# Patient Record
Sex: FEMALE | Race: BLACK OR AFRICAN AMERICAN | NOT HISPANIC OR LATINO | Employment: UNEMPLOYED | URBAN - METROPOLITAN AREA
[De-identification: names, ages, dates, MRNs, and addresses within clinical notes are randomized per-mention and may not be internally consistent; named-entity substitution may affect disease eponyms.]

---

## 2024-07-10 ENCOUNTER — APPOINTMENT (OUTPATIENT)
Dept: INTERPRETER SERVICES | Facility: CLINIC | Age: 24
End: 2024-07-10

## 2024-07-10 ENCOUNTER — HOSPITAL ENCOUNTER (EMERGENCY)
Facility: CLINIC | Age: 24
Discharge: HOME | End: 2024-07-12
Attending: EMERGENCY MEDICINE | Admitting: EMERGENCY MEDICINE

## 2024-07-10 DIAGNOSIS — R44.3 HALLUCINATIONS: ICD-10-CM

## 2024-07-10 DIAGNOSIS — F23 ACUTE PSYCHOSIS (H): ICD-10-CM

## 2024-07-10 PROBLEM — F20.3 UNDIFFERENTIATED SCHIZOPHRENIA (H): Status: ACTIVE | Noted: 2024-07-10

## 2024-07-10 LAB
ALBUMIN UR-MCNC: NEGATIVE MG/DL
AMPHETAMINES UR QL SCN: NORMAL
ANION GAP SERPL CALCULATED.3IONS-SCNC: 7 MMOL/L (ref 7–15)
APPEARANCE UR: CLEAR
BARBITURATES UR QL SCN: NORMAL
BASOPHILS # BLD AUTO: 0 10E3/UL (ref 0–0.2)
BASOPHILS NFR BLD AUTO: 0 %
BENZODIAZ UR QL SCN: NORMAL
BILIRUB UR QL STRIP: NEGATIVE
BUN SERPL-MCNC: 7.5 MG/DL (ref 6–20)
BZE UR QL SCN: NORMAL
CALCIUM SERPL-MCNC: 9.1 MG/DL (ref 8.6–10)
CANNABINOIDS UR QL SCN: NORMAL
CHLORIDE SERPL-SCNC: 102 MMOL/L (ref 98–107)
COLOR UR AUTO: ABNORMAL
CREAT SERPL-MCNC: 0.66 MG/DL (ref 0.51–0.95)
DEPRECATED HCO3 PLAS-SCNC: 28 MMOL/L (ref 22–29)
EGFRCR SERPLBLD CKD-EPI 2021: >90 ML/MIN/1.73M2
EOSINOPHIL # BLD AUTO: 0.1 10E3/UL (ref 0–0.7)
EOSINOPHIL NFR BLD AUTO: 2 %
ERYTHROCYTE [DISTWIDTH] IN BLOOD BY AUTOMATED COUNT: 13.8 % (ref 10–15)
FENTANYL UR QL: NORMAL
FLUAV RNA SPEC QL NAA+PROBE: NEGATIVE
FLUBV RNA RESP QL NAA+PROBE: NEGATIVE
GLUCOSE SERPL-MCNC: 92 MG/DL (ref 70–99)
GLUCOSE UR STRIP-MCNC: NEGATIVE MG/DL
HCG UR QL: NEGATIVE
HCT VFR BLD AUTO: 39.1 % (ref 35–47)
HGB BLD-MCNC: 12.3 G/DL (ref 11.7–15.7)
HGB UR QL STRIP: ABNORMAL
HOLD SPECIMEN: NORMAL
HOLD SPECIMEN: NORMAL
IMM GRANULOCYTES # BLD: 0 10E3/UL
IMM GRANULOCYTES NFR BLD: 0 %
KETONES UR STRIP-MCNC: NEGATIVE MG/DL
LEUKOCYTE ESTERASE UR QL STRIP: NEGATIVE
LYMPHOCYTES # BLD AUTO: 2.7 10E3/UL (ref 0.8–5.3)
LYMPHOCYTES NFR BLD AUTO: 37 %
MAGNESIUM SERPL-MCNC: 1.9 MG/DL (ref 1.7–2.3)
MCH RBC QN AUTO: 28.6 PG (ref 26.5–33)
MCHC RBC AUTO-ENTMCNC: 31.5 G/DL (ref 31.5–36.5)
MCV RBC AUTO: 91 FL (ref 78–100)
MONOCYTES # BLD AUTO: 0.7 10E3/UL (ref 0–1.3)
MONOCYTES NFR BLD AUTO: 10 %
MUCOUS THREADS #/AREA URNS LPF: PRESENT /LPF
NEUTROPHILS # BLD AUTO: 3.7 10E3/UL (ref 1.6–8.3)
NEUTROPHILS NFR BLD AUTO: 52 %
NITRATE UR QL: NEGATIVE
NRBC # BLD AUTO: 0 10E3/UL
NRBC BLD AUTO-RTO: 0 /100
OPIATES UR QL SCN: NORMAL
PCP QUAL URINE (ROCHE): NORMAL
PH UR STRIP: 5.5 [PH] (ref 5–7)
PLATELET # BLD AUTO: 275 10E3/UL (ref 150–450)
POTASSIUM SERPL-SCNC: 4 MMOL/L (ref 3.4–5.3)
RBC # BLD AUTO: 4.3 10E6/UL (ref 3.8–5.2)
RBC URINE: 3 /HPF
RSV RNA SPEC NAA+PROBE: NEGATIVE
SARS-COV-2 RNA RESP QL NAA+PROBE: NEGATIVE
SODIUM SERPL-SCNC: 137 MMOL/L (ref 135–145)
SP GR UR STRIP: 1.01 (ref 1–1.03)
SQUAMOUS EPITHELIAL: 2 /HPF
UROBILINOGEN UR STRIP-MCNC: <2 MG/DL
WBC # BLD AUTO: 7.2 10E3/UL (ref 4–11)
WBC URINE: 1 /HPF

## 2024-07-10 PROCEDURE — 85025 COMPLETE CBC W/AUTO DIFF WBC: CPT

## 2024-07-10 PROCEDURE — 80307 DRUG TEST PRSMV CHEM ANLYZR: CPT

## 2024-07-10 PROCEDURE — 36415 COLL VENOUS BLD VENIPUNCTURE: CPT

## 2024-07-10 PROCEDURE — 83735 ASSAY OF MAGNESIUM: CPT

## 2024-07-10 PROCEDURE — 99284 EMERGENCY DEPT VISIT MOD MDM: CPT

## 2024-07-10 PROCEDURE — 87637 SARSCOV2&INF A&B&RSV AMP PRB: CPT | Performed by: EMERGENCY MEDICINE

## 2024-07-10 PROCEDURE — 81001 URINALYSIS AUTO W/SCOPE: CPT

## 2024-07-10 PROCEDURE — 81025 URINE PREGNANCY TEST: CPT

## 2024-07-10 PROCEDURE — 82374 ASSAY BLOOD CARBON DIOXIDE: CPT

## 2024-07-10 RX ORDER — OLANZAPINE 10 MG/2ML
10 INJECTION, POWDER, FOR SOLUTION INTRAMUSCULAR 2 TIMES DAILY PRN
Status: DISCONTINUED | OUTPATIENT
Start: 2024-07-10 | End: 2024-07-12 | Stop reason: HOSPADM

## 2024-07-10 RX ORDER — LORAZEPAM 1 MG/1
1 TABLET ORAL EVERY 8 HOURS PRN
Status: DISCONTINUED | OUTPATIENT
Start: 2024-07-10 | End: 2024-07-12 | Stop reason: HOSPADM

## 2024-07-10 RX ORDER — IBUPROFEN 600 MG/1
600 TABLET, FILM COATED ORAL EVERY 6 HOURS PRN
Status: DISCONTINUED | OUTPATIENT
Start: 2024-07-10 | End: 2024-07-12 | Stop reason: HOSPADM

## 2024-07-10 ASSESSMENT — ENCOUNTER SYMPTOMS
DIZZINESS: 0
NAUSEA: 0
RHINORRHEA: 0
VOMITING: 0
HEMATURIA: 0
HEADACHES: 1
DIARRHEA: 0
SLEEP DISTURBANCE: 1
ABDOMINAL PAIN: 0
CONFUSION: 0
SHORTNESS OF BREATH: 0
LIGHT-HEADEDNESS: 0
FEVER: 0
WEAKNESS: 0
NUMBNESS: 0
DYSURIA: 0
HALLUCINATIONS: 0
SORE THROAT: 0
BLOOD IN STOOL: 0
CONSTIPATION: 0
NERVOUS/ANXIOUS: 0
CHILLS: 0

## 2024-07-10 ASSESSMENT — ACTIVITIES OF DAILY LIVING (ADL)
ADLS_ACUITY_SCORE: 35
ADLS_ACUITY_SCORE: 33
ADLS_ACUITY_SCORE: 35
ADLS_ACUITY_SCORE: 33

## 2024-07-10 ASSESSMENT — COLUMBIA-SUICIDE SEVERITY RATING SCALE - C-SSRS
1. IN THE PAST MONTH, HAVE YOU WISHED YOU WERE DEAD OR WISHED YOU COULD GO TO SLEEP AND NOT WAKE UP?: NO
2. HAVE YOU ACTUALLY HAD ANY THOUGHTS OF KILLING YOURSELF?: NO
2. HAVE YOU ACTUALLY HAD ANY THOUGHTS OF KILLING YOURSELF IN THE PAST MONTH?: NO
6. HAVE YOU EVER DONE ANYTHING, STARTED TO DO ANYTHING, OR PREPARED TO DO ANYTHING TO END YOUR LIFE?: NO
ATTEMPT LIFETIME: NO
1. HAVE YOU WISHED YOU WERE DEAD OR WISHED YOU COULD GO TO SLEEP AND NOT WAKE UP?: NO
6. HAVE YOU EVER DONE ANYTHING, STARTED TO DO ANYTHING, OR PREPARED TO DO ANYTHING TO END YOUR LIFE?: NO
TOTAL  NUMBER OF ABORTED OR SELF INTERRUPTED ATTEMPTS LIFETIME: NO
TOTAL  NUMBER OF INTERRUPTED ATTEMPTS LIFETIME: NO

## 2024-07-10 NOTE — ED PROVIDER NOTES
EMERGENCY DEPARTMENT ENCOUNTER      NAME: Delma Guzmán  AGE: 23 year old female  YOB: 2000  MRN: [id number]  EVALUATION DATE & TIME: No admission date for patient encounter.    PCP: No Ref-Primary, Physician    ED PROVIDER: Elisabeth Garnett PA-C      Chief Complaint   Patient presents with    Hallucinations    Headache         FINAL IMPRESSION:  1. Acute psychosis (H)          ED COURSE & MEDICAL DECISION MAKIN:51 PM Met with patient for initial interview. Plan for care discussed.  3:24 PM Staffed patient with Dr. William.  4:25 PM RN alerted patient was about to have DEC assessment, but then DEC power went out.  5:17 PM Spoke with DEC  who agrees with plan for admission and psychiatry consultation.  5:27 PM Reevaluated and updated patient. Plan for admission discussed with patient and patient's mother who are agreeable. All questions and concerns addressed. Patient is holdable.    23 year old otherwise healthy female presents to the Emergency Department for evaluation of hallucinations and insomnia since . Patient was brought in by her mother. Patient also reports a 1/10 frontal headache. Upon exam, patient is afebrile and hemodynamically stable. Patient is intermittently smiling, laugh, and whispering throughout exam, but denies auditory or visual hallucinations when asked. Patient answers questions appropriately and neurologic exam otherwise without focal deficit. Differential diagnosis includes but not limited to schizophrenia, bipolar manic episode, drug-induced psychosis, psychosis secondary to insomnia, pregnancy, UTI, electrolyte abnormality, anemia, dehydration. Low suspicion for SAH as headache not sudden onset, 10/10 severity, and no family history of cerebral aneurysms. Low suspicion for intracranial tumor as no neurological deficits on exam, symptoms not worse at night, no cancer history, fevers/chills, night sweats, or unintentional weight loss. Low suspicion  for meningitis as no associated fevers/chills, nuchal rigidity, or other meningeal signs on exam. Low suspicion for temporal arteritis as no associated tenderness to palpation over temporal arteries or associated vision changes.     CBC without leukocytosis or anemia. BMP WNL. Mg WNL. Urine preg negative. UDS negative. UA without evidence of infection.     Symptoms and workup most consistent with acute psychosis. DEC  evaluated. Plan for admission with psychiatry consultation in the morning. Patient is holdable. Patient and patient's mother were made aware of the above findings.     Medical Decision Making  Obtained supplemental history:Supplemental history obtained?: Documented in chart and Caregiver  Reviewed external records: External records reviewed?: No  Care impacted by chronic illness:N/A  Care significantly affected by social determinants of health:N/A  Did you consider but not order tests?: Work up considered but not performed and documented in chart, if applicable  Did you interpret images independently?: Independent interpretation of ECG and images noted in documentation, when applicable.  Consultation discussion with other provider:Did you involve another provider (consultant, , pharmacy, etc.)?: I discussed the care with another health care provider, see documentation for details.  Admit.    MEDICATIONS GIVEN IN THE EMERGENCY:  Medications   ibuprofen (ADVIL/MOTRIN) tablet 600 mg (has no administration in time range)   LORazepam (ATIVAN) tablet 1 mg (has no administration in time range)   OLANZapine (zyPREXA) injection 10 mg (has no administration in time range)       NEW PRESCRIPTIONS STARTED AT TODAY'S ER VISIT  New Prescriptions    No medications on file          =================================================================    HPI    Patient information was obtained from: patient and patient's mother    Use of : Yes (Phone) - Language Mani Guzmán is a  23 year old otherwise female who presents to this ED for evaluation of hallucinations and insomnia since Sunday. Patient was brought in by her mother. Patient also reports a 1/10 frontal headache. Patient is accompanied by her mother who reports that she has started noticing hallucinations on Sunday.  She notes that the patient is intermittently smiling, laughing, and whispering to herself.  Patient's mother notes that she has no previous history of hallucinations or other mental health disorders in the past.  They deny any family history of similar symptoms in other family members.  Patient denies any new medications or any chronic medications, changes in dosing.  She reports she takes no medications at baseline.  She denies any alcohol or other drug use.  She reports a 1 out of 10 frontal headache as well that has been going on for the last few days.  She denies any exertional component to the headache, blood thinners, fevers, chills, night sweats, unintentional weight loss, cancer history, vision changes, numbness/weakness/tingling in her arms or legs.  She denies any hallucinations, visual or auditory.  She denies any suicidal or homicidal ideation or plan.  She states that she has not slept in 2 days.  She denies previous history of insomnia.  No other impulsive behaviors per patient's mother.  Patient otherwise denies any fevers, chills, chest pain, shortness of breath, abdominal pain, concerns for pregnancy, urinary symptoms, or any other complaints. She reports her LMP was today.    REVIEW OF SYSTEMS   Review of Systems   Constitutional:  Negative for chills and fever.   HENT:  Negative for congestion, hearing loss, rhinorrhea, sore throat and tinnitus.    Eyes:  Negative for visual disturbance.   Respiratory:  Negative for shortness of breath.    Cardiovascular:  Negative for chest pain.   Gastrointestinal:  Negative for abdominal pain, blood in stool, constipation, diarrhea, nausea and vomiting.    Genitourinary:  Positive for vaginal bleeding (current menses). Negative for dysuria, hematuria, pelvic pain, urgency and vaginal discharge.   Allergic/Immunologic: Negative for immunocompromised state.   Neurological:  Positive for headaches. Negative for dizziness, syncope, weakness, light-headedness and numbness.   Psychiatric/Behavioral:  Positive for sleep disturbance. Negative for confusion, hallucinations, self-injury and suicidal ideas. The patient is not nervous/anxious.      PAST MEDICAL HISTORY:  No past medical history on file.    PAST SURGICAL HISTORY:  No past surgical history on file.        CURRENT MEDICATIONS:    No current outpatient medications on file.      ALLERGIES:  No Known Allergies    FAMILY HISTORY:  No family history on file.    SOCIAL HISTORY:   Social History     Socioeconomic History    Marital status: Single       VITALS:  /69   Pulse 78   Temp 98.1  F (36.7  C)   Resp 19   Wt 72.6 kg (160 lb)   SpO2 98%     PHYSICAL EXAM    Constitutional:  Alert, in no acute distress. Cooperative.  EYES: Conjunctivae clear. PERRL. EOM intact. Peripheral fields intact.  HENT:  Atraumatic, normocephalic. Oropharynx clear. Moist membranes. Tongue without deviation. No temporal tenderness to palpation. No trismus. No nuchal rigidity. Full ROM neck without pain.  Respiratory:  Respirations even, unlabored, in no acute respiratory distress. Lungs clear to auscultation bilaterally without wheeze, rhonchi, or rales. No cough. Speaks in full sentences easily.  Cardiovascular:  Regular rate and rhythm, good peripheral perfusion. No peripheral edema. No chest wall tenderness.  GI: Soft, flat, non-distended.  Musculoskeletal:  No edema. No cyanosis. Range of motion major extremities intact.    Integument: Warm, Dry. No rash to visualized skin.   Neurologic:  Alert & oriented x4. No focal deficits noted. GCS 15. Sensation intact. Motor intact. Coordination intact. 5/5 strength.   Psych: Reacting to  external stimuli, whispering to self and smiling/laughing intermittently. Denies suicidal or homicidal ideation or plan.     LAB:  All pertinent labs reviewed and interpreted.  Results for orders placed or performed during the hospital encounter of 07/10/24   Basic metabolic panel   Result Value Ref Range    Sodium 137 135 - 145 mmol/L    Potassium 4.0 3.4 - 5.3 mmol/L    Chloride 102 98 - 107 mmol/L    Carbon Dioxide (CO2) 28 22 - 29 mmol/L    Anion Gap 7 7 - 15 mmol/L    Urea Nitrogen 7.5 6.0 - 20.0 mg/dL    Creatinine 0.66 0.51 - 0.95 mg/dL    GFR Estimate >90 >60 mL/min/1.73m2    Calcium 9.1 8.6 - 10.0 mg/dL    Glucose 92 70 - 99 mg/dL   Result Value Ref Range    Magnesium 1.9 1.7 - 2.3 mg/dL   UA with Microscopic reflex to Culture    Specimen: Urine, Clean Catch   Result Value Ref Range    Color Urine Light Yellow Colorless, Straw, Light Yellow, Yellow    Appearance Urine Clear Clear    Glucose Urine Negative Negative mg/dL    Bilirubin Urine Negative Negative    Ketones Urine Negative Negative mg/dL    Specific Gravity Urine 1.011 1.001 - 1.030    Blood Urine 0.1 mg/dL (A) Negative    pH Urine 5.5 5.0 - 7.0    Protein Albumin Urine Negative Negative mg/dL    Urobilinogen Urine <2.0 <2.0 mg/dL    Nitrite Urine Negative Negative    Leukocyte Esterase Urine Negative Negative    Mucus Urine Present (A) None Seen /LPF    RBC Urine 3 (H) <=2 /HPF    WBC Urine 1 <=5 /HPF    Squamous Epithelials Urine 2 (H) <=1 /HPF   HCG qualitative urine (UPT)   Result Value Ref Range    hCG Urine Qualitative Negative Negative   CBC with platelets and differential   Result Value Ref Range    WBC Count 7.2 4.0 - 11.0 10e3/uL    RBC Count 4.30 3.80 - 5.20 10e6/uL    Hemoglobin 12.3 11.7 - 15.7 g/dL    Hematocrit 39.1 35.0 - 47.0 %    MCV 91 78 - 100 fL    MCH 28.6 26.5 - 33.0 pg    MCHC 31.5 31.5 - 36.5 g/dL    RDW 13.8 10.0 - 15.0 %    Platelet Count 275 150 - 450 10e3/uL    % Neutrophils 52 %    % Lymphocytes 37 %    % Monocytes 10  %    % Eosinophils 2 %    % Basophils 0 %    % Immature Granulocytes 0 %    NRBCs per 100 WBC 0 <1 /100    Absolute Neutrophils 3.7 1.6 - 8.3 10e3/uL    Absolute Lymphocytes 2.7 0.8 - 5.3 10e3/uL    Absolute Monocytes 0.7 0.0 - 1.3 10e3/uL    Absolute Eosinophils 0.1 0.0 - 0.7 10e3/uL    Absolute Basophils 0.0 0.0 - 0.2 10e3/uL    Absolute Immature Granulocytes 0.0 <=0.4 10e3/uL    Absolute NRBCs 0.0 10e3/uL   Extra Red Top Tube   Result Value Ref Range    Hold Specimen JIC    Extra Purple Top Tube   Result Value Ref Range    Hold Specimen JIC    Urine Drug Screen Panel   Result Value Ref Range    Amphetamines Urine Screen Negative Screen Negative    Barbituates Urine Screen Negative Screen Negative    Benzodiazepine Urine Screen Negative Screen Negative    Cannabinoids Urine Screen Negative Screen Negative    Cocaine Urine Screen Negative Screen Negative    Fentanyl Qual Urine Screen Negative Screen Negative    Opiates Urine Screen Negative Screen Negative    PCP Urine Screen Negative Screen Negative       RADIOLOGY:  Reviewed all pertinent imaging. Please see official radiology report.  No orders to display     Elisabeth Garnett PA-C  [facility] EMERGENCY ROOM  [address] [address] [phone]      Elisabeth Garnett PA-C  07/10/24 6209

## 2024-07-10 NOTE — ED NOTES
I am seeing this patient along with Elisabeth Garnett PA-C. I had a face to face encounter with this patient seen by the Advanced Practice Provider (SARAH).  I have seen, examined, and discussed the patient with the SARAH and agree with their assessment and plan of management. I personally saw the patient and performed a substantive portion of the visit including all aspects of the medical decision making.    HPI: 23-year-old female here with family for evaluation of hallucinations.  Denies drug use, substance use, family history of mental health, suicidal or homicidal ideation.  No previous history of same    Physical Exam:  awake, alert, clearly responding to external stimuli, laughing at things that are not there, psychotic appearing.  No SI or HI      LABS  Pertinent lab results reviewed in chart.  Labs Ordered and Resulted from Time of ED Arrival to Time of ED Departure   ROUTINE UA WITH MICROSCOPIC REFLEX TO CULTURE - Abnormal       Result Value    Color Urine Light Yellow      Appearance Urine Clear      Glucose Urine Negative      Bilirubin Urine Negative      Ketones Urine Negative      Specific Gravity Urine 1.011      Blood Urine 0.1 mg/dL (*)     pH Urine 5.5      Protein Albumin Urine Negative      Urobilinogen Urine <2.0      Nitrite Urine Negative      Leukocyte Esterase Urine Negative      Mucus Urine Present (*)     RBC Urine 3 (*)     WBC Urine 1      Squamous Epithelials Urine 2 (*)    BASIC METABOLIC PANEL - Normal    Sodium 137      Potassium 4.0      Chloride 102      Carbon Dioxide (CO2) 28      Anion Gap 7      Urea Nitrogen 7.5      Creatinine 0.66      GFR Estimate >90      Calcium 9.1      Glucose 92     MAGNESIUM - Normal    Magnesium 1.9     HCG QUALITATIVE URINE - Normal    hCG Urine Qualitative Negative     URINE DRUG SCREEN PANEL - Normal    Amphetamines Urine Screen Negative      Barbituates Urine Screen Negative      Benzodiazepine Urine Screen Negative      Cannabinoids Urine Screen  Negative      Cocaine Urine Screen Negative      Fentanyl Qual Urine Screen Negative      Opiates Urine Screen Negative      PCP Urine Screen Negative     CBC WITH PLATELETS AND DIFFERENTIAL    WBC Count 7.2      RBC Count 4.30      Hemoglobin 12.3      Hematocrit 39.1      MCV 91      MCH 28.6      MCHC 31.5      RDW 13.8      Platelet Count 275      % Neutrophils 52      % Lymphocytes 37      % Monocytes 10      % Eosinophils 2      % Basophils 0      % Immature Granulocytes 0      NRBCs per 100 WBC 0      Absolute Neutrophils 3.7      Absolute Lymphocytes 2.7      Absolute Monocytes 0.7      Absolute Eosinophils 0.1      Absolute Basophils 0.0      Absolute Immature Granulocytes 0.0      Absolute NRBCs 0.0         EKG      RADIOLOGY  No orders to display       PROCEDURES       ED COURSE & MEDICAL DECISION MAKING    Pertinent Labs and Imagaing reviewed (see chart for details)    23 year old female here for evaluation of hallucinations.  Clinically, she is absolutely hallucinating and appears psychotic.  No history of this in the chart, but mom noted that there was an episode of something similar back in 2019 and she was better when she was on medication, but they do not know what that medication was and that physician has since retired.  Clearly age wise she is at risk for this being a true schizophrenia versus a substance-induced mood disorder.  We have ordered a drug screen but patient denies any.  After DEC assessment, plan will be to pursue admission due to acute psychosis.  We do not feel comfortable discharging the patient in her current state.  Patient will remain here in the ED awaiting bed placement.    At the conclusion of the encounter I discussed  the results of all of the tests and the disposition.   The questions were answered.  The patient or family acknowledged understanding and was agreeable with the care plan.       FINAL IMPRESSION      1. Acute psychosis (H)            CRITICAL CARE  0  Minutes    Sigrid William MD  7/10/2024 3:24 PM       Sigrid William MD  07/10/24 0800     162.56

## 2024-07-10 NOTE — ED PROVIDER NOTES
[facility] EMERGENCY ROOM   ED Mental Health Observation - Initiation Note    Delma Guzmán was placed into observation at 5:24 PM on 7/10/2024 for Psychosis.   Patient is expected to be under observation status for a minimum of eight hours.    MD Nataliia Parr Shari Ann, MD  07/10/24 9804

## 2024-07-10 NOTE — ED TRIAGE NOTES
Pt arrives with mom with reports of a headache for the past couple days, insomnia and hallucinations. Pt seems distracted in triage but is able to answer questions via [employer] .      Triage Assessment (Adult)       Row Name 07/10/24 1416          Triage Assessment    Airway WDL WDL        Respiratory WDL    Respiratory WDL WDL        Skin Circulation/Temperature WDL    Skin Circulation/Temperature WDL WDL        Cardiac WDL    Cardiac WDL WDL        Peripheral/Neurovascular WDL    Peripheral Neurovascular WDL WDL

## 2024-07-10 NOTE — Clinical Note
Delma Guzmán was seen and treated in our emergency department on 7/10/2024.  She may return to work on 07/29/2024.       If you have any questions or concerns, please don't hesitate to call.      Bandar Linares MD

## 2024-07-10 NOTE — ED NOTES
Patient's mom came out of the room with the patient wanting to leave the hospital with the patient. Through [employer] , patient's mom asking that patient be allowed to go home briefly with her to get some stuff they need then bring her back. RN told patient that this is not an option as DEC recommended overnight hospital stay for observation. MD was notified of patient's request and agrees that patient is hold able and cannot leave tonight. At this point, patient agrees to stay as long as the mom stays with her.  Woodrow Diehl RN  7/10/2024  6:32 PM

## 2024-07-11 ENCOUNTER — TELEPHONE (OUTPATIENT)
Dept: BEHAVIORAL HEALTH | Facility: CLINIC | Age: 24
End: 2024-07-11

## 2024-07-11 LAB
ATRIAL RATE - MUSE: 71 BPM
DIASTOLIC BLOOD PRESSURE - MUSE: NORMAL MMHG
INTERPRETATION ECG - MUSE: NORMAL
P AXIS - MUSE: 47 DEGREES
PR INTERVAL - MUSE: 146 MS
QRS DURATION - MUSE: 70 MS
QT - MUSE: 390 MS
QTC - MUSE: 423 MS
R AXIS - MUSE: 25 DEGREES
SYSTOLIC BLOOD PRESSURE - MUSE: NORMAL MMHG
T AXIS - MUSE: 28 DEGREES
VENTRICULAR RATE- MUSE: 71 BPM

## 2024-07-11 PROCEDURE — 250N000013 HC RX MED GY IP 250 OP 250 PS 637: Performed by: EMERGENCY MEDICINE

## 2024-07-11 PROCEDURE — 93005 ELECTROCARDIOGRAM TRACING: CPT | Performed by: REGISTERED NURSE

## 2024-07-11 PROCEDURE — 99207 PR NO BILLABLE SERVICE THIS VISIT: CPT | Performed by: REGISTERED NURSE

## 2024-07-11 RX ORDER — QUETIAPINE FUMARATE 25 MG/1
25 TABLET, FILM COATED ORAL 2 TIMES DAILY
Status: DISCONTINUED | OUTPATIENT
Start: 2024-07-11 | End: 2024-07-12 | Stop reason: HOSPADM

## 2024-07-11 RX ADMIN — QUETIAPINE FUMARATE 25 MG: 25 TABLET ORAL at 18:41

## 2024-07-11 NOTE — ED NOTES
Pt wandered into waiting room and hallway, reoriented patient and got pt a 1:1 sitter in room. Pt cooperative with returning to room.

## 2024-07-11 NOTE — CONSULTS
Psychiatry Consultation; Follow up       Psychiatry consulted regarding initiation of medications.     Delma Guzmán presents to the ED with family/friends. Patient is presenting to the ED for the following concerns: Significant behavioral change.   Factors that make the mental health crisis life threatening or complex are:  Pt came to ED with her mother because she has been responding to internal stimuli and not sleeping. Pt was assessed with a [place]  and her mother provided most of the information as pt was frequently laughing to herself or staring off into space. Mother reported things were fine, no stressors until a week ago when symptoms began. Pt is not sleeping and c/o of a headache. Pt and mother denied SI, SIB and HI.       Patient was unavailable when this writer attempted to meet with her. Patient background discussed with [facility]. EKG ordered prior to starting medications. If QTc below 500, can initiated Seroquel 25 mg BID. Discussed recommendations with inpatient medical provider.     Given acuity of patient's presentation, would recommend continued referral to inpatient psychiatry - can call [facility] at [phone].     Will keep psychiatry consult order open for follow up and will see patient 7/12/24.         Page me or re-consult psychiatry as needed.        Ida Josue, MARGO, APRN  Consult/Liaison Psychiatry  [facility]   Contact information available via [facility] Paging/Directory.  If I am not available, please call [facility] intake ([phone])

## 2024-07-11 NOTE — PROGRESS NOTES
"Triage & Transition Services, [facility]     Therapy Progress Note    Patient: Naciimo goes by \"Naciimo,\" uses she/her pronouns  Date of Service: July 11, 2024  Site of Service: [facility] EMERGENCY ROOM                             [id number]  Patient was seen yes  Mode of Assessment: Virtual: AmWell    Presentation Summary: Writer met with patient virtually via [employer].  Met with patient via [employer] .  Patient's mother is present at bedside.  Patient was awake and alert, she was sitting up on the gurney.  Patient said \"I am feeling well today\".  When asked about sleep overnight, patient said \"yes I slept good\", though later patient's mother reported patient continued to struggle with sleep overnight and she didn't get much rest.  Patient denied any significant changes or stressors, she said everything has been fine at home and at work, patient said the only change is \"I've been talking to myself\".  Patient continues to appear to be responding to internal stimuli, she has been whispering to herself, laughing/smiling inappropriately.  At times, patient was fidgeting with her hands and placed her hands on her face, and when her mother started talking she abruptly laid down and stopped engaging with writer.  Writer reviewed plan of care and recommendation for IP MH admission.  Patient said she understood, though said she is ambivalent because she wants to go home.  Patient's mother expressed concern over continued insomnia and hallucinations, she encouraged patient to continue to consider IP MH admission.  Patient ultimately agreed, she continues to consent to treatment and is voluntary for admission.  Patient's mother expressed she has been feeling frustrated that the patient will not allow her to leave the hospital, patient replied that she wants her mother there and she feels scared when/if she leaves.  Patient and her mother agree to talk about this further.  Patient " consented to psychiatry consult, patient's mother added it will be important for the patient to start medications.  Patient continues to present with impaired insight and judgement related to symptoms.    Therapeutic Intervention(s) Provided: Coached on coping techniques/relaxation skills to help improve distress tolerance and managing intense emotions., Reviewed healthy living that supports positive mental health, including looking at sleep hygiene, regular movement, nutrition, and regular socialization.    Current Symptoms: anxious avoidance, apathy, impaired decision making anxious distractability      Mental Status Exam   Affect: Labile  Appearance: Appropriate  Attention Span/Concentration: Inattentive, Attentive  Eye Contact: Variable    Fund of Knowledge: Delayed   Language /Speech Content: Fluent  Language /Speech Volume: Normal  Language /Speech Rate/Productions: Minimally Responsive  Recent Memory: Variable  Remote Memory: Poor  Mood: Apathetic  Orientation to Person: Yes   Orientation to Place: Yes  Orientation to Time of Day: Yes  Orientation to Date: Yes     Situation (Do they understand why they are here?): Yes  Psychomotor Behavior: Normal  Thought Content: Hallucinations  Thought Form: Tangential    Treatment Objective(s) Addressed: rapport building, orienting the patient to therapy, processing feelings, identifying an appropriate aftercare plan, assessing safety, exploring obstacles to safety in the community, identifying additional supports    Patient Response to Interventions: acceptance expressed, needs reinforcement    Progress Towards Goals: Patient Reports Symptoms Are: ongoing  Patient Progress Toward Goals: is making progress  Next Step to Work Toward Discharge: symptom stabilization, patient ability to engage in safety planning, engaging in safety planning with collateral sources      Plan: inpatient mental health  yes provider ADRIAN Sequeira  yes    Clinical Substantiation:  Continue to recommend IP MH admission for further safety and stabilization. Patient presents with decreased mental capacity, responding to internal stimuli, and insomnia.  Patient is not able to identify any significant stressors or changes, she continues to present with impaired insight and judgement. Patient continues to consent for treatment and is voluntary for IP MH admission.    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

## 2024-07-11 NOTE — ED NOTES
IP MH Referral Acuity Rating Score (RARS)     LMHP complete at referral to IP MH, with DEC; and, daily while awaiting IP MH placement. Call score to PPS.  CRITERIA SCORING   New 71 HH and Involuntary for IP MH (not adolescent) 0/1   Boarding over 24 hours 1/1   Vulnerable adult at least 55+ with multiple co morbidities; or, Patient age 11 or under 0/1   Suicide ideation without relief of precipitating factors 0/1   Current plan for suicide 0/1   Current plan for homicide 0/1   Imminent risk or actual attempt to seriously harm another without relief of factors precipitating the attempt 0/1   Severe dysfunction in daily living (ex: complete neglect for self care, extreme disruption in vegetative function, extreme deterioration in social interactions) 1/1   Recent (last 2 weeks) or current physical aggression in the ED 0/1   Restraints or seclusion episode in ED 0/1   Verbal aggression, agitation, yelling, etc., while in the ED 0/1   Active psychosis with psychomotor agitation or catatonia 1/1   Need for constant or near constant redirection (from leaving, from others, etc).  0/1   Intrusive or disruptive behaviors 1/1   TOTAL Acuity Total Score: 4

## 2024-07-11 NOTE — TELEPHONE ENCOUNTER
R:   [facility] availability within [facility]  [facility] @ 12[facility]03[facility]    [facility] is posting 0 beds. [phone]; per call at 7:06 am to Lakesha, they are at cap.      [facility] is posting 0 beds. Negative covid required     [facility] is posting 0 beds. Neg covid. No high school/[facility]. [phone]. Per call at 7:08 am to Juventino, he will ask charge nurse to call us back re: bed availability.      [facility] is posting 0 beds. [phone]     [facility] is posting 0 beds. [phone]      [facility] is posting 0 beds. Ages 18-35. Negative covid. [phone]. Per call at 7:10 am, left a  asking for a call back re: bed avail.      [facility] is posting 0 beds.      [facility] is posting 0 beds [phone]     Pt to remain on the worklist pending appropriate bed availability

## 2024-07-11 NOTE — TELEPHONE ENCOUNTER
R: [place]  Access Inpatient Bed Call Log  7/10/2024 11:35 PM  Intake has called facilities that have not updated their bed status within the last 12 hours.??      ADULTS:     *METRO  [place] -- [facility]: @ cap per website.  [place] -- [facility]:  @ Cap per website.   [place] -- [facility]: @ Cap per website.  [place] -- [facility]: @ Cap per website. - Per Mac Aguilar @ 11:35PM, they are full  [place] -- [facility]: @ Cap per website.  [facility] -- [facility]: @ Cap per website.  [place] -- [facility]/YA beds @ Cap per website. Ages 18-28, Voluntary only, COVID test req'd, NO aggression, physical or sexual assault, violence hx or drug abuse, or psychosis. -Per Shu @ 11:42PM, 1 YA bed- Reviewing at capacity  [place] -- [facility]: @ Cap per website.  [place] -- [facility]: @ cap per website.  [place] -- [facility]:  @ Posting 1 bed. - Per Joanie @ 11:36PM, they are at full capacity tonight     Pt remains on waitlist pending appropriate placement availability.

## 2024-07-11 NOTE — ED NOTES
Per provider no need to make room safe at this time. Pt denies any thoughts of hurting self or others. Mother is at bedside. Call light within reach.

## 2024-07-11 NOTE — CONSULTS
Diagnostic Evaluation Consultation  Crisis Assessment    Patient Name: Delma Guzmán  Age:  23 year old  Legal Sex: female  Gender Identity: female  Pronouns:   Race: Black or   Ethnicity: Not  or   Language: Egyptian      Patient was assessed: Virtual: [employer]   Crisis Assessment Start Date: 07/10/24  Crisis Assessment Start Time: 1645  Crisis Assessment Stop Time: 1720  Patient location: [facility] EMERGENCY ROOM                             [id number]    Referral Data and Chief Complaint  Delma Guzmán presents to the ED with family/friends. Patient is presenting to the ED for the following concerns: Significant behavioral change.   Factors that make the mental health crisis life threatening or complex are:  Pt came to ED with her mother because she has been responding to internal stimuli and not sleeping. Pt was assessed with a Egyptian  and her mother provided most of the information as pt was frequently laughing to herself or staring off into space. Mother reported things were fine, no stressors until a week ago when symptoms began. Pt is not sleeping and c/o of a headache. Pt and mother denied SI, SIB and HI..      Informed Consent and Assessment Methods  Explained the crisis assessment process, including applicable information disclosures and limits to confidentiality, assessed understanding of the process, and obtained consent to proceed with the assessment.  Assessment methods included conducting a formal interview with patient, review of medical records, collaboration with medical staff, and obtaining relevant collateral information from family and community providers when available.  : done     Patient response to interventions: acceptance expressed  Coping skills were attempted to reduce the crisis:  none     History of the Crisis   Mother reported pt had a similar experience in 2019 and mother said she went to the hospital but  "was no admitted, said she was given medicaiton and she was better/back to normal in a month. Mother reported she went to a hospital in [place] but said the doctor who saw pt retired and mother did not remember what medication pt was given, mother also did not remember what diagnosis was given to pt at this time. Mother was very concerned about pt returning to work, she works for [employer] and mother repeatedly asked for a letter to be written to pt's work excusing her absence, mother does not want pt to lose this job. Pt lives at home with her mother and two brothers and it is suspected that pt is the primary financial provider for the family. Pt reported her only support is her mother and she does not have any friends, pt could not identify any coping skills but said she enjoys movies, resturants and eating food.  Mother and pt requested pt to be discharged with medication, but attending and  felt it was safer for pt to stabilize at the hospital rather than returning to work before stabilizing and ensuring pt was prescribed the correct medications and given the proper diagnosis. Pt denied command hallucinations and visual hallucinations but she was unable to describe what she was hearing and responding to, when asked what she was laughing at she reported \"I just laugh at myself sometimes.\"    Brief Psychosocial History  Family:  Single, Children no  Support System:  Parent(s), Sibling(s)  Employment Status:  employed full-time  Source of Income:  salary/wages  Financial Environmental Concerns:  none  Current Hobbies:  family functions, television/movies/videos, cooking/baking  Barriers in Personal Life:  lack of companionship    Significant Clinical History  Current Anxiety Symptoms:     Current Depression/Trauma:  impaired decision making  Current Somatic Symptoms:  somatic symptoms (abdominal pain, headache, tension)  Current Psychosis/Thought Disturbance:  auditory hallucinations  Current Eating " Symptoms:     Chemical Use History:      Past diagnosis:  Other (mother did not know or could not remember previous diagnosis)  Family history:  No known history of mental health or chemical health concerns  Past treatment:  Psychiatric Medication Management  Details of most recent treatment:  similar experience in 2019, was discharged with meds  Other relevant history:          Collateral Information  Is there collateral information: Yes     Collateral information name, relationship, phone number:  SHOLA DUQUE (Mother)  [phone] (Home Phone)    What happened today: Pt has been talking and laughing to herself for about a week, this happened before in 2019 and she got better after medications so mom brought her in for help.     What is different about patient's functioning: Pt laughs at herself and talks to herself, mom cannot understand what she is saying to herself, pt is unable to work.     Concern about alcohol/drug use:      What do you think the patient needs:      Has patient made comments about wanting to kill themselves/others: no    If d/c is recommended, can they take part in safety/aftercare planning:  yes    Additional collateral information:  Mom would like pt to discharge home with medications, mom would like a note from the hospital provided for pt's work to excuse her.     Risk Assessment  [place] Suicide Severity Rating Scale Full Clinical Version:  Suicidal Ideation  Q6 Suicide Behavior (Lifetime): no     Suicidal Behavior (Lifetime)  Actual Attempt (Lifetime): No  Has subject engaged in non-suicidal self-injurious behavior? (Lifetime): No  Interrupted Attempts (Lifetime): No  Aborted or Self-Interrupted Attempt (Lifetime): No  Preparatory Acts or Behavior (Lifetime): No    [place] Suicide Severity Rating Scale Recent:   Suicidal Ideation (Recent)  Q1 Wished to be Dead (Past Month): no  Q2 Suicidal Thoughts (Past Month): no  Level of Risk per Screen: no risks indicated          Environmental  or Psychosocial Events: challenging interpersonal relationships, social isolation  Protective Factors: Protective Factors: strong bond to family unit, community support, or employment, responsibilities and duties to others, including pets and children, lives in a responsibly safe and stable environment    Does the patient have thoughts of harming others? Feels Like Hurting Others: no  Previous Attempt to Hurt Others: no  Is the patient engaging in sexually inappropriate behavior?: no    Is the patient engaging in sexually inappropriate behavior?  no        Mental Status Exam   Affect: Labile  Appearance: Appropriate  Attention Span/Concentration: Inattentive  Eye Contact: Avoidant    Fund of Knowledge: Delayed   Language /Speech Content: Non-Fluent  Language /Speech Volume: Normal  Language /Speech Rate/Productions: Minimally Responsive  Recent Memory: Variable  Remote Memory: Poor  Mood: Euphoric  Orientation to Person: Yes   Orientation to Place: Yes  Orientation to Time of Day: Yes  Orientation to Date: Yes     Situation (Do they understand why they are here?): Yes  Psychomotor Behavior: Normal  Thought Content: Hallucinations  Thought Form: Flight of Ideas     Mini-Cog Assessment  Number of Words Recalled:    Clock-Drawing Test:     Three Item Recall:    Mini-Cog Total Score:       Medication  Psychotropic medications:   Medication Orders - Psychiatric (From admission, onward)      Start     Dose/Rate Route Frequency Ordered Stop    07/10/24 1724  OLANZapine (zyPREXA) injection 10 mg         10 mg Intramuscular 2 TIMES DAILY PRN 07/10/24 1724      07/10/24 1724  LORazepam (ATIVAN) tablet 1 mg         1 mg Oral EVERY 8 HOURS PRN 07/10/24 1724               Current Care Team  Patient Care Team:  No Ref-Primary, Physician as PCP - General    Diagnosis  Patient Active Problem List   Diagnosis Code    Hallucinations R44.3    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 Pt's mother reports pt has experienced this before but does not remember what the diagnosis was or what medication was used to treat the psychosis. Pt's mother is requesting she return to work as soon as possible, pt would benefir from in patient admission for medication stabilization and observation and should not discharge until symptoms of current presentation are gone.          Severe psychiatric, behavioral or other comorbid conditions are appropriate for management at inpatient mental health as indicated by at least one of the following: Psychiatric Symptoms  Severe dysfunction in daily living is present as indicated by at least one of the following: Extreme deterioration in social interactions, Complete withdrawal from all social interactions  Situation and expectations are appropriate for inpatient care: Voluntary treatment at lower level of care is not feasible, Need for physical restraint, seclusion, or other involuntary treatment intervention is present, Patient management/treatment at lower level of care is not feasible or is inappropriate  Inpatient mental health services are necessary to meet patient needs and at least one of the following: Specific condition related to admission diagnosis is present and judged likely to further improve at proposed level of care, Specific condition related to admission diagnosis is present and judged likely to deteriorate in absence of treatment at proposed level of care      Patient coping skills attempted to reduce the crisis:  none    Disposition  Recommended disposition: Inpatient Mental Health        Reviewed case and recommendations with attending provider. Attending Name: Elisabeth Garnett PA-C       Attending  concurs with disposition: yes       Patient and/or validated legal guardian concurs with disposition:   no       Final disposition:  inpatient mental health    Legal status on admission: 72 Hour Hold    Assessment Details   Total duration spent with the patient: 35 min     CPT code(s) utilized: 63697 - Psychotherapy for Crisis - 60 (30-74*) min    ZELALEM Corea, Psychotherapist  [facility] - Triage & Transition Services  Callback: [phone]

## 2024-07-11 NOTE — PROGRESS NOTES
Naciimo HAYLEE Guzmán  July 10, 2024  Plan of Care Hand-off Note     Patient Care Path: (P) inpatient mental health    Plan for Care:   Pt presents with decreased mental capacity, auditory hallucinations that she is not able to explain or provide detail. Pt's mother reports pt has experienced this before but does not remember what the diagnosis was or what medication was used to treat the psychosis. Pt's mother is requesting she return to work as soon as possible, pt would benefir from in patient admission for medication stabilization and observation and should not discharge until symptoms of current presentation are gone.    Identified Goals and Safety Issues: (P) Stabilize pt with medication, alleviate psychosis/hallucination symptoms.    Overview:  (P) SHOLA DUQUE (Mother)  [phone] (Home Phone)       (P) Dial numbers for the patient - do not hand them a phone and walk away    Legal Status: Legal Status at Admission: 72 Hour Hold  72 Hour Hold - Date/Time Initiated: 7/10/24 5:30 pm  72 Hour Hold - Date/Time Ends: 7/13/24 5:30 pm    Psychiatry Consult: yes       Updated   regarding plan of care.           ZELALEM Corea

## 2024-07-11 NOTE — ED NOTES
[facility] ED Mental Health Handoff Note:     Assuming care from: Dr Linus Rdz    Brief HPI: 23 year old female signed out to me by the above provider. See initial ED Provider note for full details of the presentation.   In brief, patient presented for acute/new onset psychosis     Home meds reviewed and ordered/administered: Yes (none)  Medically stable for inpatient mental health admission: Yes.  Evaluated by mental health: Yes. The recommendation is for inpatient mental health treatment. Bed search in process  Safety concerns: At the time I received sign out, there were no safety concerns.    Hold Status:  Active Orders   N/A            ED Meds:  Medications   ibuprofen (ADVIL/MOTRIN) tablet 600 mg (has no administration in time range)   LORazepam (ATIVAN) tablet 1 mg (has no administration in time range)   OLANZapine (zyPREXA) injection 10 mg (has no administration in time range)     No orders to display       ED Course:       There were no significant events during my shift.    Patient was signed out to the oncoming provider, Dr. Boo Wiley at shift change    Impression:    ICD-10-CM    1. Acute psychosis (H)  F23           Plan:    Awaiting inpatient mental health admission/transfer.    Ciro Barron MD  [facility] EMERGENCY ROOM  [facility] [facility] [id number] [phone]     Ciro Barron MD  07/11/24 0709

## 2024-07-11 NOTE — ED PROVIDER NOTES
[facility] EMERGENCY ROOM   ED Mental Health Observation - Daily Note for 7/11/2024    Delma Guzmán is a 23 year old female currently boarding in the ED while awaiting placement for Psychosis.  Please see the initial H&P for this patient's presentation, workup, and disposition plan.     Hold Status:  Patient is Voluntary, but holdable    Plan:  In brief, the patient's presentation is notable for psychosis.   Patient is awaiting Mental health placement    Interim History:  There were no significant events since last note.    Physical Exam:  /69   Pulse 78   Temp 98.1  F (36.7  C)   Resp 19   Wt 72.6 kg (160 lb)   SpO2 98%   No respiratory distress, on room air   Well perfused  Behavior appropriate    Medications provided prior to my care:  Medications   ibuprofen (ADVIL/MOTRIN) tablet 600 mg (has no administration in time range)   LORazepam (ATIVAN) tablet 1 mg (has no administration in time range)   OLANZapine (zyPREXA) injection 10 mg (has no administration in time range)       Laboratory (reviewed and interpreted):  Labs Ordered and Resulted from Time of ED Arrival to Time of ED Departure   ROUTINE UA WITH MICROSCOPIC REFLEX TO CULTURE - Abnormal       Result Value    Color Urine Light Yellow      Appearance Urine Clear      Glucose Urine Negative      Bilirubin Urine Negative      Ketones Urine Negative      Specific Gravity Urine 1.011      Blood Urine 0.1 mg/dL (*)     pH Urine 5.5      Protein Albumin Urine Negative      Urobilinogen Urine <2.0      Nitrite Urine Negative      Leukocyte Esterase Urine Negative      Mucus Urine Present (*)     RBC Urine 3 (*)     WBC Urine 1      Squamous Epithelials Urine 2 (*)    BASIC METABOLIC PANEL - Normal    Sodium 137      Potassium 4.0      Chloride 102      Carbon Dioxide (CO2) 28      Anion Gap 7      Urea Nitrogen 7.5      Creatinine 0.66      GFR Estimate >90      Calcium 9.1      Glucose 92     MAGNESIUM - Normal     Magnesium 1.9     HCG QUALITATIVE URINE - Normal    hCG Urine Qualitative Negative     URINE DRUG SCREEN PANEL - Normal    Amphetamines Urine Screen Negative      Barbituates Urine Screen Negative      Benzodiazepine Urine Screen Negative      Cannabinoids Urine Screen Negative      Cocaine Urine Screen Negative      Fentanyl Qual Urine Screen Negative      Opiates Urine Screen Negative      PCP Urine Screen Negative     INFLUENZA A/B, RSV, & SARS-COV2 PCR - Normal    Influenza A PCR Negative      Influenza B PCR Negative      RSV PCR Negative      SARS CoV2 PCR Negative     CBC WITH PLATELETS AND DIFFERENTIAL    WBC Count 7.2      RBC Count 4.30      Hemoglobin 12.3      Hematocrit 39.1      MCV 91      MCH 28.6      MCHC 31.5      RDW 13.8      Platelet Count 275      % Neutrophils 52      % Lymphocytes 37      % Monocytes 10      % Eosinophils 2      % Basophils 0      % Immature Granulocytes 0      NRBCs per 100 WBC 0      Absolute Neutrophils 3.7      Absolute Lymphocytes 2.7      Absolute Monocytes 0.7      Absolute Eosinophils 0.1      Absolute Basophils 0.0      Absolute Immature Granulocytes 0.0      Absolute NRBCs 0.0         ED Course:  7:00 AM I met with the patient and discussed plan with care team.     Impression/Plan:  1. Acute psychosis (H)        MD Beatrice Hernandez Michael, MD  07/11/24 3425

## 2024-07-12 ENCOUNTER — HOSPITAL ENCOUNTER (INPATIENT)
Age: 24
End: 2024-07-12
Attending: PSYCHIATRY & NEUROLOGY | Admitting: PSYCHIATRY & NEUROLOGY

## 2024-07-12 ENCOUNTER — TELEPHONE (OUTPATIENT)
Dept: BEHAVIORAL HEALTH | Facility: CLINIC | Age: 24
End: 2024-07-12

## 2024-07-12 ENCOUNTER — APPOINTMENT (OUTPATIENT)
Dept: INTERPRETER SERVICES | Facility: CLINIC | Age: 24
End: 2024-07-12

## 2024-07-12 VITALS
DIASTOLIC BLOOD PRESSURE: 63 MMHG | HEART RATE: 79 BPM | OXYGEN SATURATION: 98 % | WEIGHT: 160 LBS | TEMPERATURE: 98.1 F | SYSTOLIC BLOOD PRESSURE: 107 MMHG | RESPIRATION RATE: 19 BRPM

## 2024-07-12 PROCEDURE — 99244 OFF/OP CNSLTJ NEW/EST MOD 40: CPT | Performed by: REGISTERED NURSE

## 2024-07-12 PROCEDURE — 250N000013 HC RX MED GY IP 250 OP 250 PS 637: Performed by: EMERGENCY MEDICINE

## 2024-07-12 RX ORDER — LANOLIN ALCOHOL/MO/W.PET/CERES
3 CREAM (GRAM) TOPICAL
OUTPATIENT
Start: 2024-07-12

## 2024-07-12 RX ORDER — OLANZAPINE 10 MG/1
10 TABLET ORAL 3 TIMES DAILY PRN
OUTPATIENT
Start: 2024-07-12

## 2024-07-12 RX ORDER — IBUPROFEN 600 MG/1
600 TABLET, FILM COATED ORAL EVERY 6 HOURS PRN
OUTPATIENT
Start: 2024-07-12

## 2024-07-12 RX ORDER — QUETIAPINE FUMARATE 25 MG/1
25 TABLET, FILM COATED ORAL 2 TIMES DAILY
Qty: 60 TABLET | Refills: 0 | Status: SHIPPED | OUTPATIENT
Start: 2024-07-12 | End: 2024-08-11

## 2024-07-12 RX ORDER — QUETIAPINE FUMARATE 25 MG/1
25 TABLET, FILM COATED ORAL 2 TIMES DAILY
Status: DISCONTINUED | OUTPATIENT
Start: 2024-07-12 | End: 2024-07-12

## 2024-07-12 RX ORDER — ACETAMINOPHEN 325 MG/1
650 TABLET ORAL EVERY 4 HOURS PRN
OUTPATIENT
Start: 2024-07-12

## 2024-07-12 RX ORDER — AMOXICILLIN 250 MG
1 CAPSULE ORAL 2 TIMES DAILY PRN
OUTPATIENT
Start: 2024-07-12

## 2024-07-12 RX ORDER — QUETIAPINE FUMARATE 25 MG/1
25 TABLET, FILM COATED ORAL 2 TIMES DAILY
OUTPATIENT
Start: 2024-07-12

## 2024-07-12 RX ORDER — MAGNESIUM HYDROXIDE/ALUMINUM HYDROXICE/SIMETHICONE 120; 1200; 1200 MG/30ML; MG/30ML; MG/30ML
30 SUSPENSION ORAL EVERY 4 HOURS PRN
OUTPATIENT
Start: 2024-07-12

## 2024-07-12 RX ORDER — OLANZAPINE 10 MG/2ML
10 INJECTION, POWDER, FOR SOLUTION INTRAMUSCULAR 3 TIMES DAILY PRN
OUTPATIENT
Start: 2024-07-12

## 2024-07-12 RX ORDER — HYDROXYZINE HYDROCHLORIDE 25 MG/1
25 TABLET, FILM COATED ORAL EVERY 4 HOURS PRN
OUTPATIENT
Start: 2024-07-12

## 2024-07-12 RX ADMIN — QUETIAPINE FUMARATE 25 MG: 25 TABLET ORAL at 08:06

## 2024-07-12 ASSESSMENT — ACTIVITIES OF DAILY LIVING (ADL)
ADLS_ACUITY_SCORE: 35

## 2024-07-12 ASSESSMENT — COLUMBIA-SUICIDE SEVERITY RATING SCALE - C-SSRS
6. HAVE YOU EVER DONE ANYTHING, STARTED TO DO ANYTHING, OR PREPARED TO DO ANYTHING TO END YOUR LIFE?: NO
1. SINCE LAST CONTACT, HAVE YOU WISHED YOU WERE DEAD OR WISHED YOU COULD GO TO SLEEP AND NOT WAKE UP?: NO
TOTAL  NUMBER OF ABORTED OR SELF INTERRUPTED ATTEMPTS SINCE LAST CONTACT: NO
TOTAL  NUMBER OF INTERRUPTED ATTEMPTS SINCE LAST CONTACT: NO
ATTEMPT SINCE LAST CONTACT: NO
2. HAVE YOU ACTUALLY HAD ANY THOUGHTS OF KILLING YOURSELF?: NO
SUICIDE, SINCE LAST CONTACT: NO

## 2024-07-12 NOTE — ED PROVIDER NOTES
NSR, rate 71, normal intervals, no acute ichemia    I have independently reviewed and interpreted the EKG(s) documented above     Pt started on seroquel 25mg BID per psych recommendations.     Joel Wiley MD  07/11/24 1919

## 2024-07-12 NOTE — TELEPHONE ENCOUNTER
R: [place]  Access Inpatient Bed Call Log  7/12/2024 12:11 AM  Intake has called facilities that have not updated their bed status within the last 12 hours.??      ADULTS:     *METRO  [place] -- [facility]: @ cap per website.  [place] -- [facility]:  @ Cap per website.    [place] -- [facility]: @ Cap per website.  [place] -- [facility]: @ Cap per website. - 12:20 AM Per Ethan, they are able to review.   [place] -- [facility]: @ Cap per website.  [facility] -- [facility]: @ Cap per website.   [place] -- [facility] beds @ Posting 1 beds. Ages 18-28, Voluntary only, COVID test req'd, NO aggression, physical or sexual assault, violence hx or drug abuse, or psychosis - 12:13 AM Per Sergio, they have beds available for review.  [place] -- [facility]: @ Cap per website.  [place] -- [facility]: @ cap per website.  [place] -- [facility]:  @ Cap per website.      Pt remains on waitlist pending appropriate placement availability.

## 2024-07-12 NOTE — ED NOTES
[place] in room now    Peng Advancement Flap Text: The defect edges were debeveled with a #15 scalpel blade.  Given the location of the defect, shape of the defect and the proximity to free margins a Peng advancement flap was deemed most appropriate.  Using a sterile surgical marker, an appropriate advancement flap was drawn incorporating the defect and placing the expected incisions within the relaxed skin tension lines where possible. The area thus outlined was incised deep to adipose tissue with a #15 scalpel blade.  The skin margins were undermined to an appropriate distance in all directions utilizing iris scissors.

## 2024-07-12 NOTE — DISCHARGE INSTRUCTIONS
If you having trouble getting medications, or she is is not taking them, or she seems to be getting worse, please bring her back to emergency department.    [facility]  Director: Jen Callahan   [address]  [address]   [place], [place]   [phone]   [email or web address]  Offers outpatient mental health services in Thai. Offers sliding scale fee option. Mon. - Fri.   8:00 am. - 7:00 pm. Sat. 9:00 am - 3:00 pm    [address] [address]  [phone]  [email or web address]  [facility] has specialized programs for Thai and Southeast  refugees that includes   psychiatry, day treatment, case management, and women's advocacy services. Bilingual staff   speak Thai, [place] citizen, Hmong, Carlos, Cambodian, and Montenegrin.          Coordinators from [facility]) will be calling you in the next 24-48 hours to ensure that you have the resources you need. For additonal need of mental health services, you can also contact [facility] coordinators directly at [phone].    Recommendations:    1)  Take all medications as prescribed by psychiatrist or primary doctor  2)  Consider working with a mental health therapist  3)  Return to the hospital if symptoms persist or worsen    If I am feeling unsafe or I am in a crisis, I will:  - Contact my established care providers   - Call the National Suicide Prevention Lifeline: 101.626.7715   - [place] CRISIS TEXT Line 870147  - Go to the nearest emergency room   - Call 911 or 398 or 211    Below is a list of FREE Mental Health Options in the [facility] Area:  [facility] ([facility])  [address] [place] [address], 24/7 Crisis Intervention Center  Serves those in emotional crisis with 24-hour, seven-day-a-week crisis counseling, assessment, referral, and medication management.      Suicidal: [phone] Consultation: [phone]    "  [facility]  [phone]  Serves those in need of free outpatient mental health care  Hours: Mon, Wed, Fri 1-3pm; Mon-Thurs 6:30-8:30pm    [facility]  [address] [place] [address]  [phone]    Peer Support  Call the REGINALD Helpline at  [phone]  Or text \"HelpLine\" to 69386    [employer]  Fast Tracker  Linking people to mental health and substance use disorder resources  fasttraVyconn.org     Por espanol, texto  ETHAN a 411812 o texto a 442-AYUDAME en [facility] Mental [facility] Warm Line  Peer to peer support  Monday thru Saturday, 12 pm to 10 pm  [phone] or [phone]  Text \"Support\" to 59740    National Bates on Mental Illness (REGINALD)  078.147.3063 or 1.888.REGINALD.HELPS              "

## 2024-07-12 NOTE — CONSULTS
"      Initial Psychiatric Consult   Consult date: July 12, 2024         Reason for Consult, requesting source:    Start medications    Requesting source: Bandar Linares    Labs and imaging reviewed. Provider contacted with recommendations.     Interview was conducted with interpretation through Iranian .         HPI:   Psychiatry seeing patient today regarding start of medications, as well as disposition recommendations.       Delma Guzmán presents to the ED with family/friends. Patient is presenting to the ED for the following concerns: Significant behavioral change. Factors that make the mental health crisis life threatening or complex are: Pt came to ED with her mother because she has been responding to internal stimuli and not sleeping. Pt was assessed with a Iranian  and her mother provided most of the information as pt was frequently laughing to herself or staring off into space. Mother reported things were fine, no stressors until a week ago when symptoms began. Pt is not sleeping and c/o of a headache. Pt and mother denied SI, SIB and HI.     Today, patient's mother reports, \"I would like to take her home with medications. We have a psychiatry appointment set up for her.\" Patient says, \"yes\" when asked if she would be willing to take medications. Family also requesting letter from a provider to explain to patient's employer that she has been in the hospital. Patient denies suicidality or thoughts of harming others. She denies AH/VH, but appears responding to internal stimuli.         Past Psychiatric History:   Mother reported pt had a similar experience in 2019 and mother said she went to the hospital but was no admitted, said she was given medicaiton and she was better/back to normal in a month.         Substance Use and History:     Tobacco Use    Smoking status: Not on file    Smokeless tobacco: Not on file   Substance Use Topics    Alcohol use: Not on file           " Past Medical History:   PAST MEDICAL HISTORY: No past medical history on file.    PAST SURGICAL HISTORY: No past surgical history on file.          Family History:   FAMILY HISTORY: No family history on file.        Social History:   Pt lives at home with her family. She currently works at [employer] and it is suspected that patient is the primary financial support of family.          Physical ROS:   The 10 point Review of Systems is negative other than noted in the HPI or here.           Medications:     Current Facility-Administered Medications   Medication Dose Route Frequency Provider Last Rate Last Admin    QUEtiapine (SEROquel) tablet 25 mg  25 mg Oral BID Joel Wiley MD   25 mg at 07/12/24 0806              Allergies:   No Known Allergies       Labs:     Recent Results (from the past 48 hour(s))   Basic metabolic panel    Collection Time: 07/10/24  3:23 PM   Result Value Ref Range    Sodium 137 135 - 145 mmol/L    Potassium 4.0 3.4 - 5.3 mmol/L    Chloride 102 98 - 107 mmol/L    Carbon Dioxide (CO2) 28 22 - 29 mmol/L    Anion Gap 7 7 - 15 mmol/L    Urea Nitrogen 7.5 6.0 - 20.0 mg/dL    Creatinine 0.66 0.51 - 0.95 mg/dL    GFR Estimate >90 >60 mL/min/1.73m2    Calcium 9.1 8.6 - 10.0 mg/dL    Glucose 92 70 - 99 mg/dL   Magnesium    Collection Time: 07/10/24  3:23 PM   Result Value Ref Range    Magnesium 1.9 1.7 - 2.3 mg/dL   CBC with platelets and differential    Collection Time: 07/10/24  3:23 PM   Result Value Ref Range    WBC Count 7.2 4.0 - 11.0 10e3/uL    RBC Count 4.30 3.80 - 5.20 10e6/uL    Hemoglobin 12.3 11.7 - 15.7 g/dL    Hematocrit 39.1 35.0 - 47.0 %    MCV 91 78 - 100 fL    MCH 28.6 26.5 - 33.0 pg    MCHC 31.5 31.5 - 36.5 g/dL    RDW 13.8 10.0 - 15.0 %    Platelet Count 275 150 - 450 10e3/uL    % Neutrophils 52 %    % Lymphocytes 37 %    % Monocytes 10 %    % Eosinophils 2 %    % Basophils 0 %    % Immature Granulocytes 0 %    NRBCs per 100 WBC 0 <1 /100    Absolute Neutrophils 3.7 1.6 - 8.3  10e3/uL    Absolute Lymphocytes 2.7 0.8 - 5.3 10e3/uL    Absolute Monocytes 0.7 0.0 - 1.3 10e3/uL    Absolute Eosinophils 0.1 0.0 - 0.7 10e3/uL    Absolute Basophils 0.0 0.0 - 0.2 10e3/uL    Absolute Immature Granulocytes 0.0 <=0.4 10e3/uL    Absolute NRBCs 0.0 10e3/uL   Extra Purple Top Tube    Collection Time: 07/10/24  3:23 PM   Result Value Ref Range    Hold Specimen JIC    Extra Red Top Tube    Collection Time: 07/10/24  3:24 PM   Result Value Ref Range    Hold Specimen JIC    UA with Microscopic reflex to Culture    Collection Time: 07/10/24  3:42 PM    Specimen: Urine, Clean Catch   Result Value Ref Range    Color Urine Light Yellow Colorless, Straw, Light Yellow, Yellow    Appearance Urine Clear Clear    Glucose Urine Negative Negative mg/dL    Bilirubin Urine Negative Negative    Ketones Urine Negative Negative mg/dL    Specific Gravity Urine 1.011 1.001 - 1.030    Blood Urine 0.1 mg/dL (A) Negative    pH Urine 5.5 5.0 - 7.0    Protein Albumin Urine Negative Negative mg/dL    Urobilinogen Urine <2.0 <2.0 mg/dL    Nitrite Urine Negative Negative    Leukocyte Esterase Urine Negative Negative    Mucus Urine Present (A) None Seen /LPF    RBC Urine 3 (H) <=2 /HPF    WBC Urine 1 <=5 /HPF    Squamous Epithelials Urine 2 (H) <=1 /HPF   HCG qualitative urine (UPT)    Collection Time: 07/10/24  3:42 PM   Result Value Ref Range    hCG Urine Qualitative Negative Negative   Urine Drug Screen Panel    Collection Time: 07/10/24  3:42 PM   Result Value Ref Range    Amphetamines Urine Screen Negative Screen Negative    Barbituates Urine Screen Negative Screen Negative    Benzodiazepine Urine Screen Negative Screen Negative    Cannabinoids Urine Screen Negative Screen Negative    Cocaine Urine Screen Negative Screen Negative    Fentanyl Qual Urine Screen Negative Screen Negative    Opiates Urine Screen Negative Screen Negative    PCP Urine Screen Negative Screen Negative   Asymptomatic Influenza A/B, RSV, & SARS-CoV2 PCR  (COVID-19) Nasopharyngeal    Collection Time: 07/10/24  9:47 PM    Specimen: Nasopharyngeal; Swab   Result Value Ref Range    Influenza A PCR Negative Negative    Influenza B PCR Negative Negative    RSV PCR Negative Negative    SARS CoV2 PCR Negative Negative   ECG 12-LEAD WITH MUSE (LHE)    Collection Time: 07/11/24  5:26 PM   Result Value Ref Range    Systolic Blood Pressure  mmHg    Diastolic Blood Pressure  mmHg    Ventricular Rate 71 BPM    Atrial Rate 71 BPM    IL Interval 146 ms    QRS Duration 70 ms     ms    QTc 423 ms    P Axis 47 degrees    R AXIS 25 degrees    T Axis 28 degrees    Interpretation ECG       Sinus rhythm  Normal ECG  No previous ECGs available  Confirmed by SEE ED PROVIDER NOTE FOR, ECG INTERPRETATION (4000),  MARGARITA CALDERA ([id number]) on 7/11/2024 5:30:44 PM            Physical and Psychiatric Examination:     /57   Pulse 79   Temp 98.1  F (36.7  C)   Resp 19   Wt 72.6 kg (160 lb)   SpO2 100%   Weight is 160 lbs 0 oz  There is no height or weight on file to calculate BMI.    Physical Exam:  I have reviewed the physical exam as documented by by the medical team and agree with findings and assessment and have no additional findings to add at this time.         MSE:   Appears responding to internal stimuli, but denies AH/VH. Denies SI/SIB. Insight appears poor. Patient minimally engaged in conversation and her mother does most of the speaking for patient.         DSM-5 Diagnosis:   Unspecified psychosis          Assessment:   Psychiatry seeing patient today regarding initiation of medications and disposition recommendations. Patient presented to the ED due to significant behavior change, within the context of responding to internal stimuli and lack of sleep. She had a similar incident in 2019, which improved with medications. Patient does not appear to have a definitive psychiatric diagnosis, and chart review was unrevealing for previous treatments. In addition, patient  and mother did not recall medication that had been used in the past.     Patient was minimally engaged in conversation and her mother did most speaking for her. Patient appeared responding to internal stimuli and distractible. Patient's mother would like to take her home with medications, but I believe she would benefit from inpatient psychiatry for additional supports and medication management.     If patient were to request to leave before transferring to inpatient psychiatry, she is not a danger to herself or others, so she could discharge without placing a 72-hour hold.           Summary of Recommendations:   1) Started Seroquel 25 mg BID to target symptoms consistent with unspecific psychosis    2) Refer to inpatient psychiatry for additional stabilization and supports- can call [facility] at [phone]    Addendum: Discussed recommendations with provider and Extended Care.             Page me or re-consult psychiatry as needed.       Ida Josue, MARGO, APRN  Consult/Liaison Psychiatry  [facility]   Contact information available via [facility] Paging/Directory.  If I am not available, please call [facility] intake ([phone])

## 2024-07-12 NOTE — PROGRESS NOTES
"Triage and Transition Services Extended Care Reassessment     Patient: Naciimo goes by \"Naciimo,\" uses she/her pronouns  Date of Service: July 12, 2024  Site of Service: [facility] EMERGENCY ROOM                               Patient was seen yes  Mode of Assessment: Virtual: AmWell     Reason for Reassessment: significant behavior change    History of Patient's Original Emergency Room Encounter: Mother reported pt had a similar experience in 2019 and mother said she went to the hospital but was no admitted, said she was given medicaiton and she was better/back to normal in a month. Mother reported she went to a hospital in [place] but said the doctor who saw pt retired and mother did not remember what medication pt was given, mother also did not remember what diagnosis was given to pt at this time. Mother was very concerned about pt returning to work, she works for [employer] and mother repeatedly asked for a letter to be written to pt's work excusing her absence, mother does not want pt to lose this job. Pt lives at home with her mother and two brothers and it is suspected that pt is the primary financial provider for the family. Pt reported her only support is her mother and she does not have any friends, pt could not identify any coping skills but said she enjoys movies, resturants and eating food.  Mother and pt requested pt to be discharged with medication, but attending and  felt it was safer for pt to stabilize at the hospital rather than returning to work before stabilizing and ensuring pt was prescribed the correct medications and given the proper diagnosis. Pt denied command hallucinations and visual hallucinations but she was unable to describe what she was hearing and responding to, when asked what she was laughing at she reported \"I just laugh at myself sometimes.\"    Current Patient Presentation: Pt presents as \"feeling better\" and wanting to discharge home. Pt " is alert, oriented x3, anxious, and cooperative. She engages in conversation with writer and Pt's mother who is present in the room.    Presentation Summary: Pt is seen by extended care for therapeutic check-in and reassessment. Exchanged greeting, introduced self and role.    Changes Observed Since Initial Assessment: decrease in presenting symptoms    Therapeutic Interventions Provided: Reviewed healthy living that supports positive mental health, including looking at sleep hygiene, regular movement, nutrition, and regular socialization., Engaged in guided discovery, explored patient's perspectives and helped expand them through socratic dialogue.    Current Symptoms: anxious avoidance, apathy, impaired decision making, difficulty concentrating anxious distractability      Mental Status Exam   Affect: Dramatic  Appearance: Appropriate  Attention Span/Concentration: Attentive  Eye Contact: Variable    Fund of Knowledge: Appropriate   Language /Speech Content: Fluent  Language /Speech Volume: Normal  Language /Speech Rate/Productions: Minimally Responsive  Recent Memory: Intact  Remote Memory: Intact  Mood: Apathetic  Orientation to Person: Yes   Orientation to Place: Yes  Orientation to Time of Day: Yes  Orientation to Date: No     Situation (Do they understand why they are here?): Yes  Psychomotor Behavior: Normal  Thought Content: Hallucinations  Thought Form: Intact    Treatment Objective(s) Addressed: rapport building, identifying an appropriate aftercare plan, assessing safety, exploring obstacles to safety in the community, identifying additional supports    Patient Response to Interventions: acceptance expressed, needs reinforcement    Progress Towards Goals:  Patient Reports Symptoms Are: improving (Pt is requesting discharge to her mother's home.)    Patient Progress Toward Goals: is making progress  Comment: Pt continues to exhibit increased mental health symptoms with some disorganized thinking. Pt  reports hallucinations are reduced. She laughs innappropriately.  Next Step to Work Toward Discharge: patient ability to engage in safety planning, follow up on referrals    Symptom Stabilization Comment: Pt is not an acute risk for imminent danger to self or others. She is not considered to be holdable.    Ability to Engage Comment: Pt engages in aftercare planning and safety planning process.  Ability to Engage in Safety Plan: Pt engages in aftercare planning and safety planning process.  Symptom Stabilization Comment: Pt is considered to be more jl then when she arrived and able to stay safe while living with mother. Pt requests discharge. Writer assists by providing resources and referral to the Transition Clinic.    Case Management:      C-SSRS Since Last Contact:   1. Wish to be Dead (Since Last Contact): No  2. Non-Specific Active Suicidal Thoughts (Since Last Contact): No     Actual Attempt (Since Last Contact): No  Has subject engaged in non-suicidal self-injurious behavior? (Since Last Contact): No  Interrupted Attempts (Since Last Contact): No  Aborted or Self-Interrupted Attempt (Since Last Contact): No  Preparatory Acts or Behavior (Since Last Contact): No  Suicide (Since Last Contact): No     Calculated C-SSRS Risk Score (Since Last Contact): No Risk Indicated    Plan: Final Disposition / Recommended Care Path: discharge  Plan for Care reviewed with assigned Medical Provider: yes  Plan for Care Team Review: provider  Comments: Writer consulted with Bandar Linares and Ida Josue regarding disposition and discharge planning.  Patient and/or validated legal guardian concurs: yes    Clinical Substantiation: Pt reports decrease in symptoms and has been able to get sleep. She continues to experience some internal stimuli and disorganized thinking. Pt is not considered to be holdable. Upon completion of assessment and in consultation with the attending provider, the pt s circumstances and mental  state appear to be appropriate for outpatient management. It is the recommendation of this clinician that pt discharge with OP MH support. Pt indicates they are able to manage their mental health symptoms safely in the community.    Legal Status: Legal Status at Admission: Voluntary/Patient has signed consent for treatment  72 Hour Hold - Date/Time Initiated: no longer on a hold  72 Hour Hold - Date/Time Ends: no longer on a hold    Session Status: Time session started: 1241  Time session ended: 1310  Session Duration (minutes): 29 minutes  Session Number: 2  Anticipated number of sessions or this episode of care: 4    Session Start Time: 1241  Session Stop Time: 1310  CPT codes: 48997 - Psychotherapy (with patient) - 30 (16-37*) min  Time Spent: 29 minutes      CPT code(s) utilized: 24777 - Psychotherapy (with patient) - 30 (16-37*) min    Diagnosis:   Patient Active Problem List   Diagnosis Code    Hallucinations R44.3    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

## 2024-07-12 NOTE — TELEPHONE ENCOUNTER
R:    EC updated via Teams Message that Pt has discharged  [4:28 PM] Asael Miller  MRN: [id number]  please remove from the worklist    4:47 PM Intake confirmed that Pt has discharged from [facility] ED    4:50 PM Updated unit 6A Pt has dsc and will be removed from the que.    Updated [facility], admit board and removed from unit 6A que

## 2024-07-12 NOTE — ED PROVIDER NOTES
"[facility] EMERGENCY ROOM   ED Mental Health Observation - Daily Note for 7/12/2024    Delma Guzmán is a 23 year old female currently boarding in the ED while awaiting placement for Psychosis.  Please see the initial H&P for this patient's presentation, workup, and disposition plan.     Hold Status:  Patient is Voluntary, but holdable    Plan:  In brief, the patient's presentation is notable for psychosis.   Patient is awaiting Mental health placement    Interim History:  There {were/were no:313842::\"were no\"} significant events since last note.    Physical Exam:  /57   Pulse 79   Temp 98.1  F (36.7  C)   Resp 19   Wt 72.6 kg (160 lb)   SpO2 100%   No respiratory distress, on room air   Well perfused  Behavior appropriate    Medications provided prior to my care:  Medications   ibuprofen (ADVIL/MOTRIN) tablet 600 mg (has no administration in time range)   LORazepam (ATIVAN) tablet 1 mg (has no administration in time range)   OLANZapine (zyPREXA) injection 10 mg (has no administration in time range)   QUEtiapine (SEROquel) tablet 25 mg (25 mg Oral $Given 7/11/24 1841)       Laboratory (reviewed and interpreted):  Labs Ordered and Resulted from Time of ED Arrival to Time of ED Departure   ROUTINE UA WITH MICROSCOPIC REFLEX TO CULTURE - Abnormal       Result Value    Color Urine Light Yellow      Appearance Urine Clear      Glucose Urine Negative      Bilirubin Urine Negative      Ketones Urine Negative      Specific Gravity Urine 1.011      Blood Urine 0.1 mg/dL (*)     pH Urine 5.5      Protein Albumin Urine Negative      Urobilinogen Urine <2.0      Nitrite Urine Negative      Leukocyte Esterase Urine Negative      Mucus Urine Present (*)     RBC Urine 3 (*)     WBC Urine 1      Squamous Epithelials Urine 2 (*)    BASIC METABOLIC PANEL - Normal    Sodium 137      Potassium 4.0      Chloride 102      Carbon Dioxide (CO2) 28      Anion Gap 7      Urea Nitrogen 7.5      " Creatinine 0.66      GFR Estimate >90      Calcium 9.1      Glucose 92     MAGNESIUM - Normal    Magnesium 1.9     HCG QUALITATIVE URINE - Normal    hCG Urine Qualitative Negative     URINE DRUG SCREEN PANEL - Normal    Amphetamines Urine Screen Negative      Barbituates Urine Screen Negative      Benzodiazepine Urine Screen Negative      Cannabinoids Urine Screen Negative      Cocaine Urine Screen Negative      Fentanyl Qual Urine Screen Negative      Opiates Urine Screen Negative      PCP Urine Screen Negative     INFLUENZA A/B, RSV, & SARS-COV2 PCR - Normal    Influenza A PCR Negative      Influenza B PCR Negative      RSV PCR Negative      SARS CoV2 PCR Negative     CBC WITH PLATELETS AND DIFFERENTIAL    WBC Count 7.2      RBC Count 4.30      Hemoglobin 12.3      Hematocrit 39.1      MCV 91      MCH 28.6      MCHC 31.5      RDW 13.8      Platelet Count 275      % Neutrophils 52      % Lymphocytes 37      % Monocytes 10      % Eosinophils 2      % Basophils 0      % Immature Granulocytes 0      NRBCs per 100 WBC 0      Absolute Neutrophils 3.7      Absolute Lymphocytes 2.7      Absolute Monocytes 0.7      Absolute Eosinophils 0.1      Absolute Basophils 0.0      Absolute Immature Granulocytes 0.0      Absolute NRBCs 0.0         ED Course:  *** I met with the patient and discussed plan with care team.     Impression/Plan:  1. Acute psychosis (H)        Bandar Linares MD  [facility] Emergency Department  July 12, 2024 0700

## 2024-07-12 NOTE — PHARMACY-ADMISSION MEDICATION HISTORY
Pharmacist Admission Medication History    Admission medication history is complete. The information provided in this note is only as accurate as the sources available at the time of the update.    Information Source(s): Patient and chart records.    Pertinent Information: No chronic medications reported or seen in chart review/Surescripts.    Changes made to PTA medication list:  None        Medication History Completed By: Frank Wilkerson RPH 7/11/2024 9:06 PM    No outpatient medications have been marked as taking for the 7/10/24 encounter (Hospital Encounter).

## 2024-07-12 NOTE — TELEPHONE ENCOUNTER
First attempt to reach  patient via language line  regarding Transition Clinic Referral.  Left message for patient. Included the Transition Clinic phone number and requested return call to schedule.   Postponing referral for tomorrow.    Charlene France  Transition Clinic Coordinator  07/12/24 5:16 PM        ----- Message from Charlene CABRALES sent at 7/12/2024  2:46 PM CDT -----    Regarding: TC Psychiatry  Referral for MRN: [id number]-Rvmywgnlwa/med management  patient does NOT have insurance and is English speaking. Needs  referral for help with insurance.    Referral from Farrah South with [facility].

## 2024-07-12 NOTE — TELEPHONE ENCOUNTER
R:  No beds @ present time within [facility] ( @ 7:55am).  PPS called [facility] and Sergio reports no young adult beds to review for [facility].       6A Bed opened up.  Intake paged JOHN Vargas @ 10:16am to review pt for unit 6A.    Arpan called intake back @ 10:24am and concerned pt will not stay vol.  Doc to Doc initiated with ED  re: placing pt on a 72HH.   in ED is in talks with Lakesha Salgado now, and Lakesha will talk with Arpan re: Pt [facility] or [facility].  Arpan to loop back around to PPS Writer within 1-2 hours for plan.       UMBERTO Vargas accepted pt for unit 6A @ 11:46am:  [11:46 AM] Nora Vargas Ann M MRN [id number] accepted to 6A.  She is voluntary per psych consult service.     Pt placed in units queue @ 12:18PM    Intake called 6A to inform pt in queue @ 12:20p    Pt added to admit board.     [facility] Updated with placement @ 12:38pm    Indicia completed

## 2024-07-12 NOTE — TELEPHONE ENCOUNTER
R: [place]  Access Inpatient Bed Call Log 7/11/24 @3:10 pm:     Intake has called facilities that have not updated the bed status within the last 12 hours.                                 [facility] is posting 0 beds.           [facility] is posting 0 beds. [phone]; per call at 7:06 am to Lakesha, they are at cap.     [facility] is posting 0 beds. Negative covid required    [facility] is posting 0 beds. Neg covid. No [facility]. [phone]. Per call at 7:08 am to Juventino, he will ask charge nurse to call us back re: bed availability.     [facility] is posting 0 beds. [phone]    [facility] is posting 0 beds. [phone]     [facility] is posting 0 beds. Ages 18-35. Negative covid. [phone].    [facility] is posting 0 beds.     [facility] is posting 0 beds [phone]         Pt remains on the work list pending appropriate bed availability.

## 2024-07-13 NOTE — ED PROVIDER NOTES
[facility] EMERGENCY ROOM   ED Mental Health Observation - Discharge Note (Complete)    Delma Guzmán is boarding in the ED after undergoing evaluation for Mental health crisis  Please see the initial H&P for this patient's presentation, workup, and disposition plan.    Interim History:  There were significant events since the last note    Physical Exam:  /63   Pulse 79   Temp 98.1  F (36.7  C)   Resp 19   Wt 72.6 kg (160 lb)   SpO2 98%   No respiratory distress, on room air   Well perfused  Behavior appropriate    Medications provided prior to my care:  Medications - No data to display    Laboratory (reviewed and interpreted):  Labs Ordered and Resulted from Time of ED Arrival to Time of ED Departure   ROUTINE UA WITH MICROSCOPIC REFLEX TO CULTURE - Abnormal       Result Value    Color Urine Light Yellow      Appearance Urine Clear      Glucose Urine Negative      Bilirubin Urine Negative      Ketones Urine Negative      Specific Gravity Urine 1.011      Blood Urine 0.1 mg/dL (*)     pH Urine 5.5      Protein Albumin Urine Negative      Urobilinogen Urine <2.0      Nitrite Urine Negative      Leukocyte Esterase Urine Negative      Mucus Urine Present (*)     RBC Urine 3 (*)     WBC Urine 1      Squamous Epithelials Urine 2 (*)    BASIC METABOLIC PANEL - Normal    Sodium 137      Potassium 4.0      Chloride 102      Carbon Dioxide (CO2) 28      Anion Gap 7      Urea Nitrogen 7.5      Creatinine 0.66      GFR Estimate >90      Calcium 9.1      Glucose 92     MAGNESIUM - Normal    Magnesium 1.9     HCG QUALITATIVE URINE - Normal    hCG Urine Qualitative Negative     URINE DRUG SCREEN PANEL - Normal    Amphetamines Urine Screen Negative      Barbituates Urine Screen Negative      Benzodiazepine Urine Screen Negative      Cannabinoids Urine Screen Negative      Cocaine Urine Screen Negative      Fentanyl Qual Urine Screen Negative      Opiates Urine Screen Negative       PCP Urine Screen Negative     INFLUENZA A/B, RSV, & SARS-COV2 PCR - Normal    Influenza A PCR Negative      Influenza B PCR Negative      RSV PCR Negative      SARS CoV2 PCR Negative     CBC WITH PLATELETS AND DIFFERENTIAL    WBC Count 7.2      RBC Count 4.30      Hemoglobin 12.3      Hematocrit 39.1      MCV 91      MCH 28.6      MCHC 31.5      RDW 13.8      Platelet Count 275      % Neutrophils 52      % Lymphocytes 37      % Monocytes 10      % Eosinophils 2      % Basophils 0      % Immature Granulocytes 0      NRBCs per 100 WBC 0      Absolute Neutrophils 3.7      Absolute Lymphocytes 2.7      Absolute Monocytes 0.7      Absolute Eosinophils 0.1      Absolute Basophils 0.0      Absolute Immature Granulocytes 0.0      Absolute NRBCs 0.0         ED Course:  I discussed case with DEC , ED charge RN  I rounded on the patient    Impression/Plan:  Plan of care discussed at daily meeting; escalation of care considered.   Continue current plan for mental health treatment and placement, please see DEC  note for further details.    Upon reevaluation and discussion with DEC  and by the psychiatric nurse practitioner, there is still recommendation that the patient be brought in for inpatient management of her psychosis, although her mother who is acting as her caregiver and guardian given her psychosis, feels very strongly about bringing her home with medication management.  Multiple conversations were had with her mother by extended-care  as well as the psychiatric nurse practitioner and they think that she would be able to initiate medication management for the patient and care for her at home with outpatient follow-up.  There is not enough to say (in regards to placing a hold) that this care plan will be harmful for dangerous without at least attempting first.    Outpatient follow-up was arranged with the extended care , and I discharged the patient with Seroquel 25 mg  twice daily per psychiatric team's instructions.    EMERGENCY DEPARTMENT OBSERVATION status ended at 7:21 AM 7/13/2024    Discharge Management Time: > or equal to 30 minutes    1. Acute psychosis (H)    2. Hallucinations        MD Vijay Moser Oliver, MD  07/13/24 1059

## 2024-07-15 ENCOUNTER — APPOINTMENT (OUTPATIENT)
Dept: INTERPRETER SERVICES | Facility: CLINIC | Age: 24
End: 2024-07-15

## 2024-07-15 ENCOUNTER — TELEPHONE (OUTPATIENT)
Dept: BEHAVIORAL HEALTH | Facility: CLINIC | Age: 24
End: 2024-07-15

## 2024-07-15 NOTE — TELEPHONE ENCOUNTER
Called [facility] Language Line for [place] .     Called pt with . Second attempt at reaching patient. Left message asking for a return call to schedule with the TC. Referral will be closed and tracker completed.    Kandis Seals  Transition Clinic Coordinator  07/15/24 12:05 PM

## 2024-07-15 NOTE — TELEPHONE ENCOUNTER
----- Message from Charlene CABRALES sent at 7/12/2024  2:46 PM CDT -----  Regarding: TC Psychiatry  Referral for MRN: [id number]-Mmlkwnezej/med management  patient does NOT have insurance and is Citizen of [place] speaking. Needs  referral for help with insurance.    Referral from Farrah South with [facility].

## 2024-07-15 NOTE — TELEPHONE ENCOUNTER
Triage and Transition Services- Patient Follow Up Call  Service Line Making Phone Call: [facility]    Who did Writer Talk to: Patient    Details of Call: left message to return my call on answering machine via .    Velvet Evans 7/15/2024 9:14 AM

## 2024-07-17 ENCOUNTER — TELEPHONE (OUTPATIENT)
Dept: BEHAVIORAL HEALTH | Facility: CLINIC | Age: 24
End: 2024-07-17

## 2024-07-23 ENCOUNTER — TELEPHONE (OUTPATIENT)
Dept: BEHAVIORAL HEALTH | Facility: CLINIC | Age: 24
End: 2024-07-23

## 2024-07-25 NOTE — TELEPHONE ENCOUNTER
[facility] Social Work       Referral Date: 7/12/24    Reason for Referral:  General    Referral Status: (urgent or general)  Financial Resources    Method of Communication:   Phone call via [facility]     Plan or goal of contact/ previous contact information:   SW left VM for patient with contact information via [facility] .     Follow up required:   No. BASHIR has made x3 outreach attempts.     Work done on case:   N/a    Important Information and next steps:   N/a     EDGARDO Gutiérrez   - [facility]  [facility]  [email or web address]  [phone]